# Patient Record
Sex: MALE | Race: WHITE | Employment: OTHER | URBAN - NONMETROPOLITAN AREA
[De-identification: names, ages, dates, MRNs, and addresses within clinical notes are randomized per-mention and may not be internally consistent; named-entity substitution may affect disease eponyms.]

---

## 2017-03-02 ENCOUNTER — OFFICE VISIT (OUTPATIENT)
Dept: PODIATRY | Age: 82
End: 2017-03-02

## 2017-03-02 VITALS
WEIGHT: 179.6 LBS | DIASTOLIC BLOOD PRESSURE: 62 MMHG | SYSTOLIC BLOOD PRESSURE: 115 MMHG | HEART RATE: 60 BPM | BODY MASS INDEX: 29.92 KG/M2 | HEIGHT: 65 IN

## 2017-03-02 DIAGNOSIS — E11.51 DM CIRC DIS TYPE II: Primary | ICD-10-CM

## 2017-03-02 DIAGNOSIS — B35.1 DERMATOPHYTOSIS OF NAIL: ICD-10-CM

## 2017-03-02 PROBLEM — R41.3 BAD MEMORY: Status: ACTIVE | Noted: 2017-01-19

## 2017-03-02 PROBLEM — E78.5 HYPERLIPIDEMIA: Status: ACTIVE | Noted: 2017-01-19

## 2017-03-02 PROBLEM — R73.9 BLOOD GLUCOSE ELEVATED: Status: ACTIVE | Noted: 2017-01-19

## 2017-03-02 PROBLEM — I10 BP (HIGH BLOOD PRESSURE): Status: ACTIVE | Noted: 2017-01-19

## 2017-03-02 PROBLEM — C61 CA OF PROSTATE (HCC): Status: ACTIVE | Noted: 2017-01-19

## 2017-03-02 PROBLEM — M25.519 ARTHRALGIA OF SHOULDER: Status: ACTIVE | Noted: 2017-01-19

## 2017-03-02 PROCEDURE — 11720 DEBRIDE NAIL 1-5: CPT | Performed by: PODIATRIST

## 2017-03-02 PROCEDURE — 1036F TOBACCO NON-USER: CPT | Performed by: PODIATRIST

## 2017-07-10 ENCOUNTER — OFFICE VISIT (OUTPATIENT)
Dept: PODIATRY | Age: 82
End: 2017-07-10
Payer: MEDICARE

## 2017-07-10 VITALS
HEIGHT: 65 IN | HEART RATE: 68 BPM | DIASTOLIC BLOOD PRESSURE: 74 MMHG | WEIGHT: 179 LBS | SYSTOLIC BLOOD PRESSURE: 128 MMHG | BODY MASS INDEX: 29.82 KG/M2

## 2017-07-10 DIAGNOSIS — B35.1 DERMATOPHYTOSIS OF NAIL: ICD-10-CM

## 2017-07-10 DIAGNOSIS — E11.51 DM CIRC DIS TYPE II: Primary | ICD-10-CM

## 2017-07-10 PROCEDURE — 11720 DEBRIDE NAIL 1-5: CPT | Performed by: PODIATRIST

## 2017-07-10 PROCEDURE — 1036F TOBACCO NON-USER: CPT | Performed by: PODIATRIST

## 2017-10-02 ENCOUNTER — OFFICE VISIT (OUTPATIENT)
Dept: PODIATRY | Age: 82
End: 2017-10-02
Payer: MEDICARE

## 2017-10-02 VITALS
RESPIRATION RATE: 20 BRPM | HEART RATE: 64 BPM | SYSTOLIC BLOOD PRESSURE: 122 MMHG | HEIGHT: 65 IN | WEIGHT: 177.8 LBS | DIASTOLIC BLOOD PRESSURE: 70 MMHG | BODY MASS INDEX: 29.62 KG/M2

## 2017-10-02 DIAGNOSIS — E11.51 CONTROLLED TYPE 2 DM WITH PERIPHERAL CIRCULATORY DISORDER (HCC): Primary | ICD-10-CM

## 2017-10-02 DIAGNOSIS — B35.1 DERMATOPHYTOSIS OF NAIL: ICD-10-CM

## 2017-10-02 PROCEDURE — 11720 DEBRIDE NAIL 1-5: CPT | Performed by: PODIATRIST

## 2017-10-02 PROCEDURE — 1036F TOBACCO NON-USER: CPT | Performed by: PODIATRIST

## 2017-10-02 RX ORDER — SIMVASTATIN 40 MG
TABLET ORAL
COMMUNITY
Start: 2017-06-23 | End: 2017-10-02 | Stop reason: SDUPTHER

## 2017-10-02 RX ORDER — TAMSULOSIN HYDROCHLORIDE 0.4 MG/1
CAPSULE ORAL
COMMUNITY
Start: 2017-04-17

## 2017-10-02 RX ORDER — WARFARIN SODIUM 5 MG/1
TABLET ORAL
COMMUNITY
Start: 2017-09-08

## 2017-10-02 RX ORDER — SOTALOL HYDROCHLORIDE 80 MG/1
TABLET ORAL
COMMUNITY
Start: 2017-08-29 | End: 2019-08-06 | Stop reason: ALTCHOICE

## 2017-10-02 RX ORDER — SIMVASTATIN 40 MG
TABLET ORAL
COMMUNITY
Start: 2017-08-28

## 2017-10-02 RX ORDER — LISINOPRIL 20 MG/1
TABLET ORAL
COMMUNITY
Start: 2017-06-23

## 2017-10-02 NOTE — MR AVS SNAPSHOT
people who are more muscular. If your body fat is high you can improve your BMI by decreasing your calorie intake and becoming more physically active. Learn more at: [email or web address]             Medications and Orders      Your Current Medications Are              lisinopril (PRINIVIL;ZESTRIL) 20 MG tablet TAKE 1/2 TABLET EVERY DAY    metFORMIN (GLUCOPHAGE) 500 MG tablet Take 500 mg by mouth daily    sotalol (BETAPACE) 80 MG tablet TAKE 1 TABLET TWICE DAILY    tamsulosin (FLOMAX) 0.4 MG capsule TAKE 1 CAPSULE AT BEDTIME    warfarin (COUMADIN) 5 MG tablet TAKE 1 TABLET DAILY EXCEPT ON WEDNESDAY TAKE 1 AND 1/2 TABLETS    simvastatin (ZOCOR) 40 MG tablet       Allergies           No Known Allergies      We Ordered/Performed the following            DIABETES FOOT EXAM     KS DEBRIDEMENT OF NAIL(S), 1-5          Additional Information        Basic Information     Date Of Birth Sex Race Ethnicity Preferred Language    1/10/1929 Male Terry Stewart Non-/Non  English      Problem List as of 10/2/2017  Date Reviewed: 10/2/2017                Arthralgia of shoulder    Bad memory    CA of prostate ([facility] [address]    BP (high blood pressure)    Hyperlipidemia    Blood glucose elevated    Anticoagulant long-term use    A-fib ([facility] [facility]    Type 2 diabetes mellitus ([facility] [facility]      Immunizations as of 10/2/2017     Name Date    Influenza Virus Vaccine 11/27/2012, 11/1/2011, 12/7/2010      Preventive Care        Date Due    Tetanus Combination Vaccine (1 - Tdap) 1/10/1948    Zoster Vaccine 1/10/1989    Pneumococcal Vaccines (two) for all adults aged 72 and over (1 of 2 - PCV13) 1/10/1994    Yearly Flu Vaccine (1) 9/1/2017            MyChart Signup           [facility] allows you to send messages to your doctor, view your test results, renew your prescriptions, schedule appointments, view visit notes, and more. How Do I Sign Up? 1.  In your Internet browser, go to

## 2017-10-02 NOTE — PROGRESS NOTES
Subjective:  Patient presents to [facility] today for routine foot care. Patient denies any new problems with their feet. Patient's diabetic control has been not changed. No Known Allergies    Past Medical History:   Diagnosis Date    Erectile dysfunction     History of basal cell cancer     Hyperlipidemia     Hypertension     Hypertensive cardiovascular disease     Impaired fasting glucose     Osteoarthritis     Paroxysmal atrial flutter (HCC)     Prostate cancer (HCC)     post radiation, most recent prostate-specific antigen 0.00.    Skin cancer     Vasomotor rhinitis        Prior to Admission medications    Medication Sig Start Date End Date Taking? Authorizing Provider   lisinopril (PRINIVIL;ZESTRIL) 20 MG tablet TAKE 1/2 TABLET EVERY DAY 6/23/17  Yes Historical Provider, MD   metFORMIN (GLUCOPHAGE) 500 MG tablet Take 500 mg by mouth daily 8/1/15  Yes Historical Provider, MD   sotalol (BETAPACE) 80 MG tablet TAKE 1 TABLET TWICE DAILY 8/29/17  Yes Historical Provider, MD   tamsulosin (FLOMAX) 0.4 MG capsule TAKE 1 CAPSULE AT BEDTIME 4/17/17  Yes Historical Provider, MD   warfarin (COUMADIN) 5 MG tablet TAKE 1 TABLET DAILY EXCEPT ON WEDNESDAY TAKE 1 AND 1/2 TABLETS 9/8/17  Yes Historical Provider, MD   simvastatin (ZOCOR) 40 MG tablet  8/28/17  Yes Historical Provider, MD       Social History   Substance Use Topics    Smoking status: Never Smoker    Smokeless tobacco: Never Used    Alcohol use No     Review of Systems: All 12 systems reviewed and pertinent positives noted above. Objective:  Vascular: DP and PT pulses palpable 2/4, bilateral.  CFT <3 seconds, bilateral.  Hair growth present to the level of the digits, bilateral.  Edema present, bilateral.  Varicosities present, bilateral. Erythema absent, bilateral. Distal Rubor absent bilateral.  Temperature within normal limits bilateral. Hyperpigmentation present bilateral. Atrophic skin no.     Neurological: Sensation intact to

## 2018-01-08 ENCOUNTER — OFFICE VISIT (OUTPATIENT)
Dept: PODIATRY | Age: 83
End: 2018-01-08
Payer: MEDICARE

## 2018-01-08 VITALS
DIASTOLIC BLOOD PRESSURE: 68 MMHG | HEIGHT: 65 IN | HEART RATE: 64 BPM | BODY MASS INDEX: 29.39 KG/M2 | WEIGHT: 176.4 LBS | SYSTOLIC BLOOD PRESSURE: 128 MMHG

## 2018-01-08 DIAGNOSIS — I73.9 PVD (PERIPHERAL VASCULAR DISEASE) (HCC): Primary | ICD-10-CM

## 2018-01-08 DIAGNOSIS — B35.1 DERMATOPHYTOSIS OF NAIL: ICD-10-CM

## 2018-01-08 PROCEDURE — 11720 DEBRIDE NAIL 1-5: CPT | Performed by: PODIATRIST

## 2018-04-09 ENCOUNTER — OFFICE VISIT (OUTPATIENT)
Dept: PODIATRY | Age: 83
End: 2018-04-09
Payer: MEDICARE

## 2018-04-09 VITALS
HEART RATE: 72 BPM | WEIGHT: 175 LBS | SYSTOLIC BLOOD PRESSURE: 126 MMHG | RESPIRATION RATE: 20 BRPM | DIASTOLIC BLOOD PRESSURE: 70 MMHG | HEIGHT: 65 IN | BODY MASS INDEX: 29.16 KG/M2

## 2018-04-09 DIAGNOSIS — B35.1 DERMATOPHYTOSIS OF NAIL: ICD-10-CM

## 2018-04-09 DIAGNOSIS — I73.9 PVD (PERIPHERAL VASCULAR DISEASE) (HCC): Primary | ICD-10-CM

## 2018-04-09 PROCEDURE — 11720 DEBRIDE NAIL 1-5: CPT | Performed by: PODIATRIST

## 2018-07-25 ENCOUNTER — OFFICE VISIT (OUTPATIENT)
Dept: PODIATRY | Age: 83
End: 2018-07-25
Payer: MEDICARE

## 2018-07-25 VITALS
HEART RATE: 64 BPM | WEIGHT: 172.4 LBS | HEIGHT: 66 IN | BODY MASS INDEX: 27.71 KG/M2 | SYSTOLIC BLOOD PRESSURE: 128 MMHG | DIASTOLIC BLOOD PRESSURE: 68 MMHG

## 2018-07-25 DIAGNOSIS — B35.1 DERMATOPHYTOSIS OF NAIL: ICD-10-CM

## 2018-07-25 DIAGNOSIS — I73.9 PVD (PERIPHERAL VASCULAR DISEASE) (HCC): Primary | ICD-10-CM

## 2018-07-25 PROCEDURE — 99999 PR OFFICE/OUTPT VISIT,PROCEDURE ONLY: CPT | Performed by: PODIATRIST

## 2018-07-25 PROCEDURE — 11720 DEBRIDE NAIL 1-5: CPT | Performed by: PODIATRIST

## 2018-07-25 NOTE — PROGRESS NOTES
Subjective:  Patient presents to [facility] today for routine foot care. Patient denies any new problems with their feet. Patient's diabetic control has been not changed. No Known Allergies    Past Medical History:   Diagnosis Date    Erectile dysfunction     History of basal cell cancer     Hyperlipidemia     Hypertension     Hypertensive cardiovascular disease     Impaired fasting glucose     Osteoarthritis     Paroxysmal atrial flutter (HCC)     Prostate cancer (HCC)     post radiation, most recent prostate-specific antigen 0.00.    Skin cancer     Vasomotor rhinitis        Prior to Admission medications    Medication Sig Start Date End Date Taking? Authorizing Provider   lisinopril (PRINIVIL;ZESTRIL) 20 MG tablet TAKE 1/2 TABLET EVERY DAY 6/23/17  Yes Historical Provider, MD   metFORMIN (GLUCOPHAGE) 500 MG tablet Take 500 mg by mouth daily 8/1/15  Yes Historical Provider, MD   sotalol (BETAPACE) 80 MG tablet TAKE 1 TABLET TWICE DAILY 8/29/17  Yes Historical Provider, MD   tamsulosin (FLOMAX) 0.4 MG capsule TAKE 1 CAPSULE AT BEDTIME 4/17/17  Yes Historical Provider, MD   warfarin (COUMADIN) 5 MG tablet TAKE 1 TABLET DAILY EXCEPT ON WEDNESDAY TAKE 1 AND 1/2 TABLETS 9/8/17  Yes Historical Provider, MD   simvastatin (ZOCOR) 40 MG tablet  8/28/17  Yes Historical Provider, MD       Social History   Substance Use Topics    Smoking status: Never Smoker    Smokeless tobacco: Never Used    Alcohol use No     Review of Systems: All 12 systems reviewed and pertinent positives noted above. Objective:  Vascular: DP and PT pulses palpable 2/4, bilateral.  CFT <3 seconds, bilateral.  Hair growth present to the level of the digits, bilateral.  Edema present, bilateral.  Varicosities present, bilateral. Erythema absent, bilateral. Distal Rubor absent bilateral.  Temperature within normal limits bilateral. Hyperpigmentation present bilateral. Atrophic skin no.     Neurological: Sensation intact to light touch to level of digits, bilateral.  Protective sensation intact 10/10 sites via 5.07/10g [place] Monofilament, bilateral.  negative Tinel's, bilateral.  negative Valleix sign, bilateral.  Vibratory intact bilateral.  Reflexes Decreased bilateral.  Paresthesias negative. Dysthesias negative. Sharp/dull intact bilateral.    Integument:   Open lesion absent, Bilateral.  Interdigital maceration absent to web spaces,absent Bilateral.  Nails left 1 and right 1 thickened, dystrophic and crumbly, discolored with subungual debris. Fissures absent, Bilateral. Hyperkeratotic tissue is absent. Assessment:    Diagnosis Orders   1. PVD (peripheral vascular disease) (HCC)  HM DIABETES FOOT EXAM    CT DEBRIDEMENT OF NAIL(S), 1-5   2. Dermatophytosis of nail  [facility] DIABETES FOOT EXAM    CT DEBRIDEMENT OF NAIL(S), 1-5       Plan:  Diabetic foot education and exam.  Nails as mentioned above debrided in length and thickness. Patient advised about OTC treatments for nails and callous. Patient will follow up in 10 weeks for routine foot care or PRN if any further problems arise.

## 2018-07-25 NOTE — PROGRESS NOTES
Foot Care Worksheet  PCP: Mirella Shelby  Last visit:  1/31/18    Nail description:  Thick , Yellow , Crumbly , Marked limitation of ambulation     Pain resulting from thickened and dystrophy of nail plate No    Nails involved  Right   1  (T5-T9)  Left     1 (TA-T4)    Q7 1 Class A Finding - Non traumatic amputation of foot No    Q8 2 Class B Findings - Absent DP pulse No, Absent PT pulse No, Advanced tropic changes (3 required) Yes    Decrease hair growth Yes, Nail changes/thickening Yes, Pigmented changes/discoloration Yes, Skin texture (thin, shiny) Yes, Skin color (rubor/redness) No    Q9 1 Class B and 2 Class C Findings  Claudication No, Temperature change Yes, Paresthesia No, Burning No, Edema Yes

## 2019-01-02 ENCOUNTER — ANTI-COAG VISIT (OUTPATIENT)
Dept: INTERNAL MEDICINE | Age: 84
End: 2019-01-02

## 2019-01-29 ENCOUNTER — TELEPHONE (OUTPATIENT)
Dept: PODIATRY | Age: 84
End: 2019-01-29

## 2019-06-06 ENCOUNTER — HOSPITAL ENCOUNTER (OUTPATIENT)
Age: 84
Setting detail: SPECIMEN
Discharge: HOME | End: 2019-06-06
Payer: MEDICARE

## 2019-06-06 LAB
INR BLD: 2.8
PROTHROMBIN TIME: 27.9 SEC (ref 9.4–11.3)

## 2019-06-06 PROCEDURE — 85610 PROTHROMBIN TIME: CPT

## 2019-08-06 ENCOUNTER — OFFICE VISIT (OUTPATIENT)
Dept: PODIATRY | Age: 84
End: 2019-08-06
Payer: MEDICARE

## 2019-08-06 VITALS
DIASTOLIC BLOOD PRESSURE: 72 MMHG | SYSTOLIC BLOOD PRESSURE: 124 MMHG | HEART RATE: 80 BPM | RESPIRATION RATE: 20 BRPM | WEIGHT: 182 LBS | BODY MASS INDEX: 30.32 KG/M2 | HEIGHT: 65 IN

## 2019-08-06 DIAGNOSIS — I73.9 PVD (PERIPHERAL VASCULAR DISEASE) (HCC): Primary | ICD-10-CM

## 2019-08-06 DIAGNOSIS — B35.1 DERMATOPHYTOSIS OF NAIL: ICD-10-CM

## 2019-08-06 PROCEDURE — 11720 DEBRIDE NAIL 1-5: CPT | Performed by: PODIATRIST

## 2019-08-06 PROCEDURE — 99999 PR OFFICE/OUTPT VISIT,PROCEDURE ONLY: CPT | Performed by: PODIATRIST

## 2019-08-06 RX ORDER — TAMSULOSIN HYDROCHLORIDE 0.4 MG/1
0.4 CAPSULE ORAL
COMMUNITY
Start: 2019-02-27 | End: 2019-08-06 | Stop reason: SDUPTHER

## 2019-08-06 RX ORDER — WARFARIN SODIUM 5 MG/1
TABLET ORAL
COMMUNITY
Start: 2019-02-27 | End: 2019-08-06 | Stop reason: SDUPTHER

## 2019-08-06 RX ORDER — POTASSIUM CHLORIDE 750 MG/1
10 TABLET, EXTENDED RELEASE ORAL
COMMUNITY
Start: 2019-04-22

## 2019-08-06 RX ORDER — ATORVASTATIN CALCIUM 10 MG/1
10 TABLET, FILM COATED ORAL
COMMUNITY
Start: 2019-06-21

## 2019-08-06 RX ORDER — METOPROLOL SUCCINATE 25 MG/1
25 TABLET, EXTENDED RELEASE ORAL
COMMUNITY
Start: 2019-02-27

## 2019-08-06 RX ORDER — FUROSEMIDE 20 MG/1
20 TABLET ORAL
COMMUNITY
Start: 2019-06-28

## 2019-10-15 ENCOUNTER — OFFICE VISIT (OUTPATIENT)
Dept: PODIATRY | Age: 84
End: 2019-10-15
Payer: MEDICARE

## 2019-10-15 VITALS
DIASTOLIC BLOOD PRESSURE: 64 MMHG | SYSTOLIC BLOOD PRESSURE: 124 MMHG | BODY MASS INDEX: 30.99 KG/M2 | WEIGHT: 186 LBS | RESPIRATION RATE: 20 BRPM | HEIGHT: 65 IN | HEART RATE: 64 BPM

## 2019-10-15 DIAGNOSIS — I73.9 PVD (PERIPHERAL VASCULAR DISEASE) (HCC): Primary | ICD-10-CM

## 2019-10-15 DIAGNOSIS — B35.1 DERMATOPHYTOSIS OF NAIL: ICD-10-CM

## 2019-10-15 PROCEDURE — 11720 DEBRIDE NAIL 1-5: CPT | Performed by: PODIATRIST

## 2019-10-15 PROCEDURE — 99999 PR OFFICE/OUTPT VISIT,PROCEDURE ONLY: CPT | Performed by: PODIATRIST

## 2019-10-15 RX ORDER — TOBRAMYCIN 3 MG/ML
SOLUTION/ DROPS OPHTHALMIC
Refills: 0 | COMMUNITY
Start: 2019-10-08

## 2019-10-17 ENCOUNTER — HOSPITAL ENCOUNTER (OUTPATIENT)
Age: 84
Setting detail: SPECIMEN
Discharge: HOME | End: 2019-10-17
Payer: MEDICARE

## 2019-10-17 LAB
INR BLD: 2.4
PROTHROMBIN TIME: 23.6 SEC (ref 9.4–11.3)

## 2019-10-17 PROCEDURE — 85610 PROTHROMBIN TIME: CPT

## 2020-02-11 ENCOUNTER — OFFICE VISIT (OUTPATIENT)
Dept: PODIATRY | Age: 85
End: 2020-02-11
Payer: MEDICARE

## 2020-02-11 VITALS
BODY MASS INDEX: 31.62 KG/M2 | HEART RATE: 74 BPM | SYSTOLIC BLOOD PRESSURE: 130 MMHG | WEIGHT: 190 LBS | DIASTOLIC BLOOD PRESSURE: 74 MMHG

## 2020-02-11 PROCEDURE — 99999 PR OFFICE/OUTPT VISIT,PROCEDURE ONLY: CPT | Performed by: PODIATRIST

## 2020-02-11 PROCEDURE — 11720 DEBRIDE NAIL 1-5: CPT | Performed by: PODIATRIST

## 2020-02-11 NOTE — PROGRESS NOTES
Subjective:  Patient presents to [facility] today for routine foot care. Patient denies any new problems with their feet. Patient's diabetic control has been not changed. No Known Allergies    Past Medical History:   Diagnosis Date    Erectile dysfunction     History of basal cell cancer     Hyperlipidemia     Hypertension     Hypertensive cardiovascular disease     Impaired fasting glucose     Osteoarthritis     Paroxysmal atrial flutter (HCC)     Prostate cancer (HCC)     post radiation, most recent prostate-specific antigen 0.00.    Skin cancer     Vasomotor rhinitis        Prior to Admission medications    Medication Sig Start Date End Date Taking?  Authorizing Provider   tobramycin (TOBREX) 0.3 % ophthalmic solution  10/8/19  Yes Historical Provider, MD   furosemide (LASIX) 20 MG tablet Take 20 mg by mouth 6/28/19  Yes Historical Provider, MD   potassium chloride (KLOR-CON M) 10 MEQ extended release tablet Take 10 mEq by mouth 4/22/19  Yes Historical Provider, MD   metoprolol succinate (TOPROL XL) 25 MG extended release tablet Take 25 mg by mouth 2/27/19  Yes Historical Provider, MD   atorvastatin (LIPITOR) 10 MG tablet Take 10 mg by mouth 6/21/19  Yes Historical Provider, MD   Cholecalciferol 2000 units TABS Take 2,000 Units by mouth 11/10/18  Yes Historical Provider, MD   Calcium Carb-Cholecalciferol (CALCIUM 1000 + D) 1000-800 MG-UNIT TABS Take by mouth   Yes Historical Provider, MD   lisinopril (PRINIVIL;ZESTRIL) 20 MG tablet TAKE 1/2 TABLET EVERY DAY 6/23/17  Yes Historical Provider, MD   metFORMIN (GLUCOPHAGE) 500 MG tablet Take 500 mg by mouth daily 8/1/15  Yes Historical Provider, MD   tamsulosin (FLOMAX) 0.4 MG capsule TAKE 1 CAPSULE AT BEDTIME 4/17/17  Yes Historical Provider, MD   warfarin (COUMADIN) 5 MG tablet TAKE 1 TABLET DAILY EXCEPT ON WEDNESDAY TAKE 1 AND 1/2 TABLETS 9/8/17  Yes Historical Provider, MD   simvastatin (ZOCOR) 40 MG tablet  8/28/17  Yes Historical

## 2020-09-01 ENCOUNTER — OFFICE VISIT (OUTPATIENT)
Dept: PODIATRY | Age: 85
End: 2020-09-01
Payer: MEDICARE

## 2020-09-01 VITALS
BODY MASS INDEX: 32.28 KG/M2 | SYSTOLIC BLOOD PRESSURE: 124 MMHG | RESPIRATION RATE: 24 BRPM | WEIGHT: 194 LBS | HEART RATE: 68 BPM | DIASTOLIC BLOOD PRESSURE: 64 MMHG

## 2020-09-01 PROCEDURE — 11720 DEBRIDE NAIL 1-5: CPT | Performed by: PODIATRIST

## 2020-09-01 PROCEDURE — 99999 PR OFFICE/OUTPT VISIT,PROCEDURE ONLY: CPT | Performed by: PODIATRIST

## 2020-09-01 NOTE — PROGRESS NOTES
Subjective:  Patient presents to [facility] today for routine foot care. Patient denies any new problems with their feet. Patient's diabetic control has been not changed. No Known Allergies    Past Medical History:   Diagnosis Date    Erectile dysfunction     History of basal cell cancer     Hyperlipidemia     Hypertension     Hypertensive cardiovascular disease     Impaired fasting glucose     Osteoarthritis     Paroxysmal atrial flutter (HCC)     Prostate cancer (HCC)     post radiation, most recent prostate-specific antigen 0.00.    Skin cancer     Vasomotor rhinitis        Prior to Admission medications    Medication Sig Start Date End Date Taking?  Authorizing Provider   tobramycin (TOBREX) 0.3 % ophthalmic solution  10/8/19  Yes Historical Provider, MD   furosemide (LASIX) 20 MG tablet Take 20 mg by mouth 6/28/19  Yes Historical Provider, MD   potassium chloride (KLOR-CON M) 10 MEQ extended release tablet Take 10 mEq by mouth 4/22/19  Yes Historical Provider, MD   metoprolol succinate (TOPROL XL) 25 MG extended release tablet Take 25 mg by mouth 2/27/19  Yes Historical Provider, MD   atorvastatin (LIPITOR) 10 MG tablet Take 10 mg by mouth 6/21/19  Yes Historical Provider, MD   Cholecalciferol 2000 units TABS Take 2,000 Units by mouth 11/10/18  Yes Historical Provider, MD   Calcium Carb-Cholecalciferol (CALCIUM 1000 + D) 1000-800 MG-UNIT TABS Take by mouth   Yes Historical Provider, MD   lisinopril (PRINIVIL;ZESTRIL) 20 MG tablet TAKE 1/2 TABLET EVERY DAY 6/23/17  Yes Historical Provider, MD   metFORMIN (GLUCOPHAGE) 500 MG tablet Take 500 mg by mouth daily 8/1/15  Yes Historical Provider, MD   tamsulosin (FLOMAX) 0.4 MG capsule TAKE 1 CAPSULE AT BEDTIME 4/17/17  Yes Historical Provider, MD   warfarin (COUMADIN) 5 MG tablet TAKE 1 TABLET DAILY EXCEPT ON WEDNESDAY TAKE 1 AND 1/2 TABLETS 9/8/17  Yes Historical Provider, MD   simvastatin (ZOCOR) 40 MG tablet  8/28/17  Yes Historical Provider, MD       Social History     Tobacco Use    Smoking status: Never Smoker    Smokeless tobacco: Never Used   Substance Use Topics    Alcohol use: No     Alcohol/week: 0.0 standard drinks     Review of Systems: All 12 systems reviewed and pertinent positives noted above. Objective:  Vascular: DP and PT pulses palpable 2/4, bilateral.  CFT <3 seconds, bilateral.  Hair growth present to the level of the digits, bilateral.  Edema present, bilateral.  Varicosities present, bilateral. Erythema absent, bilateral. Distal Rubor absent bilateral.  Temperature within normal limits bilateral. Hyperpigmentation present bilateral. Atrophic skin no. Neurological: Sensation intact to light touch to level of digits, bilateral.  Protective sensation intact 10/10 sites via 5.07/10g [place] Monofilament, bilateral.  negative Tinel's, bilateral.  negative Valleix sign, bilateral.  Vibratory intact bilateral.  Reflexes Decreased bilateral.  Paresthesias negative. Dysthesias negative. Sharp/dull intact bilateral.    Integument:   Open lesion absent, Bilateral.  Interdigital maceration absent to web spaces,absent Bilateral.  Nails left 1 and right 1 thickened, dystrophic and crumbly, discolored with subungual debris. Fissures absent, Bilateral. Hyperkeratotic tissue is absent. Assessment:    Diagnosis Orders   1. Atherosclerosis of native artery of both lower extremities, with unspecified presence of clinical manifestation ([address]     2. Dermatophytosis of nail         Plan:  Diabetic foot education and exam.  Nails as mentioned above debrided in length and thickness. Patient advised about OTC treatments for nails and callous. Patient will follow up in 10 weeks for routine foot care or PRN if any further problems arise.

## 2020-09-01 NOTE — PROGRESS NOTES
Foot Care Worksheet  PCP: Isabelle Duque  Last visit:  10/01/2019    Nail description:  Thick , Yellow , Crumbly , Marked limitation of ambulation     Pain resulting from thickened and dystrophy of nail plate No    Nails involved  Right   1  (T5-T9)  Left     1 (TA-T4)    Q7 1 Class A Finding - Non traumatic amputation of foot No    Q8 2 Class B Findings - Absent DP pulse No, Absent PT pulse No, Advanced tropic changes (3 required) Yes    Decrease hair growth Yes, Nail changes/thickening Yes, Pigmented changes/discoloration Yes, Skin texture (thin, shiny) Yes, Skin color (rubor/redness) No    Q9 1 Class B and 2 Class C Findings  Claudication No, Temperature change Yes, Paresthesia No, Burning No, Edema Yes

## 2021-01-26 ENCOUNTER — HOSPITAL ENCOUNTER (OUTPATIENT)
Dept: LAB | Age: 86
Discharge: HOME | End: 2021-01-26
Payer: MEDICARE

## 2021-01-26 ENCOUNTER — OFFICE VISIT (OUTPATIENT)
Dept: PODIATRY | Age: 86
End: 2021-01-26
Payer: MEDICARE

## 2021-01-26 VITALS
SYSTOLIC BLOOD PRESSURE: 126 MMHG | BODY MASS INDEX: 31.48 KG/M2 | HEART RATE: 72 BPM | DIASTOLIC BLOOD PRESSURE: 72 MMHG | RESPIRATION RATE: 20 BRPM | WEIGHT: 189.2 LBS

## 2021-01-26 DIAGNOSIS — B35.1 DERMATOPHYTOSIS OF NAIL: ICD-10-CM

## 2021-01-26 DIAGNOSIS — I70.203 ATHEROSCLEROSIS OF NATIVE ARTERY OF BOTH LOWER EXTREMITIES, WITH UNSPECIFIED PRESENCE OF CLINICAL MANIFESTATION (HCC): Primary | ICD-10-CM

## 2021-01-26 LAB
ALBUMIN SERPL-MCNC: 4.1 G/DL (ref 3.5–5.2)
ALBUMIN/GLOBULIN RATIO: 1.3 (ref 1–2.5)
ALP BLD-CCNC: 81 U/L (ref 40–129)
ALT SERPL-CCNC: 15 U/L (ref 5–41)
ANION GAP SERPL CALCULATED.3IONS-SCNC: 10 MMOL/L (ref 9–17)
AST SERPL-CCNC: 19 U/L
BILIRUB SERPL-MCNC: 0.48 MG/DL (ref 0.3–1.2)
BUN BLDV-MCNC: 20 MG/DL (ref 8–23)
BUN/CREAT BLD: 15 (ref 9–20)
CALCIUM SERPL-MCNC: 9.6 MG/DL (ref 8.6–10.4)
CHLORIDE BLD-SCNC: 100 MMOL/L (ref 98–107)
CO2: 27 MMOL/L (ref 20–31)
CREAT SERPL-MCNC: 1.35 MG/DL (ref 0.7–1.2)
D-DIMER QUANTITATIVE: <0.27 MG/L FEU (ref 0–0.59)
GFR AFRICAN AMERICAN: 60 ML/MIN
GFR NON-AFRICAN AMERICAN: 49 ML/MIN
GFR SERPL CREATININE-BSD FRML MDRD: ABNORMAL ML/MIN/{1.73_M2}
GFR SERPL CREATININE-BSD FRML MDRD: ABNORMAL ML/MIN/{1.73_M2}
GLUCOSE BLD-MCNC: 136 MG/DL (ref 70–99)
POTASSIUM SERPL-SCNC: 4.4 MMOL/L (ref 3.7–5.3)
SODIUM BLD-SCNC: 137 MMOL/L (ref 135–144)
TOTAL PROTEIN: 7.3 G/DL (ref 6.4–8.3)

## 2021-01-26 PROCEDURE — 36415 COLL VENOUS BLD VENIPUNCTURE: CPT

## 2021-01-26 PROCEDURE — 80053 COMPREHEN METABOLIC PANEL: CPT

## 2021-01-26 PROCEDURE — 85379 FIBRIN DEGRADATION QUANT: CPT

## 2021-01-26 PROCEDURE — 99999 PR OFFICE/OUTPT VISIT,PROCEDURE ONLY: CPT | Performed by: PODIATRIST

## 2021-01-26 PROCEDURE — 11720 DEBRIDE NAIL 1-5: CPT | Performed by: PODIATRIST

## 2021-01-26 NOTE — PROGRESS NOTES
Subjective:  Patient presents to [facility] today for routine foot care. Patient denies any new problems with their feet. Patient's diabetic control has been not changed. No Known Allergies    Past Medical History:   Diagnosis Date    Erectile dysfunction     History of basal cell cancer     Hyperlipidemia     Hypertension     Hypertensive cardiovascular disease     Impaired fasting glucose     Osteoarthritis     Paroxysmal atrial flutter (HCC)     Prostate cancer (HCC)     post radiation, most recent prostate-specific antigen 0.00.    Skin cancer     Vasomotor rhinitis        Prior to Admission medications    Medication Sig Start Date End Date Taking?  Authorizing Provider   tobramycin (TOBREX) 0.3 % ophthalmic solution  10/8/19  Yes Historical Provider, MD   furosemide (LASIX) 20 MG tablet Take 20 mg by mouth 6/28/19  Yes Historical Provider, MD   potassium chloride (KLOR-CON M) 10 MEQ extended release tablet Take 10 mEq by mouth 4/22/19  Yes Historical Provider, MD   metoprolol succinate (TOPROL XL) 25 MG extended release tablet Take 25 mg by mouth 2/27/19  Yes Historical Provider, MD   atorvastatin (LIPITOR) 10 MG tablet Take 10 mg by mouth 6/21/19  Yes Historical Provider, MD   Cholecalciferol 2000 units TABS Take 2,000 Units by mouth 11/10/18  Yes Historical Provider, MD   Calcium Carb-Cholecalciferol (CALCIUM 1000 + D) 1000-800 MG-UNIT TABS Take by mouth   Yes Historical Provider, MD   lisinopril (PRINIVIL;ZESTRIL) 20 MG tablet TAKE 1/2 TABLET EVERY DAY 6/23/17  Yes Historical Provider, MD   metFORMIN (GLUCOPHAGE) 500 MG tablet Take 500 mg by mouth daily 8/1/15  Yes Historical Provider, MD   tamsulosin (FLOMAX) 0.4 MG capsule TAKE 1 CAPSULE AT BEDTIME 4/17/17  Yes Historical Provider, MD   warfarin (COUMADIN) 5 MG tablet TAKE 1 TABLET DAILY EXCEPT ON WEDNESDAY TAKE 1 AND 1/2 TABLETS 9/8/17  Yes Historical Provider, MD simvastatin (ZOCOR) 40 MG tablet  8/28/17  Yes Historical Provider, MD       Social History     Tobacco Use    Smoking status: Never Smoker    Smokeless tobacco: Never Used   Substance Use Topics    Alcohol use: No     Alcohol/week: 0.0 standard drinks     Review of Systems: All 12 systems reviewed and pertinent positives noted above. Objective:  Vascular: DP and PT pulses palpable 2/4, bilateral.  CFT <3 seconds, bilateral.  Hair growth present to the level of the digits, bilateral.  Edema present, bilateral.  Varicosities present, bilateral. Erythema absent, bilateral. Distal Rubor absent bilateral.  Temperature within normal limits bilateral. Hyperpigmentation present bilateral. Atrophic skin no. Neurological: Sensation intact to light touch to level of digits, bilateral.  Protective sensation intact 10/10 sites via 5.07/10g [place]ito Monofilament, bilateral.  negative Tinel's, bilateral.  negative Valleix sign, bilateral.  Vibratory intact bilateral.  Reflexes Decreased bilateral.  Paresthesias negative. Dysthesias negative. Sharp/dull intact bilateral.    Integument:   Open lesion absent, Bilateral.  Interdigital maceration absent to web spaces,absent Bilateral.  Nails left 1 and right 1 thickened, dystrophic and crumbly, discolored with subungual debris. Fissures absent, Bilateral. Hyperkeratotic tissue is absent. Assessment:    Diagnosis Orders   1. Atherosclerosis of native artery of both lower extremities, with unspecified presence of clinical manifestation ([address]     2. Dermatophytosis of nail         Plan:  Diabetic foot education and exam.  Nails as mentioned above debrided in length and thickness. Patient advised about OTC treatments for nails and callous. Patient will follow up in 10 weeks for routine foot care or PRN if any further problems arise.

## 2021-01-26 NOTE — PROGRESS NOTES
Foot Care Worksheet  PCP: Cristal Adams  Last visit:  01 / 26 /     Nail description:  Thick , Yellow , Crumbly , Marked limitation of ambulation     Pain resulting from thickened and dystrophy of nail plate No    Nails involved  Right   1  (T5-T9)  Left     1 (TA-T4)    Q7 1 Class A Finding - Non traumatic amputation of foot No    Q8 2 Class B Findings - Absent DP pulse No, Absent PT pulse No, Advanced tropic changes (3 required) Yes    Decrease hair growth Yes, Nail changes/thickening Yes, Pigmented changes/discoloration Yes, Skin texture (thin, shiny) Yes, Skin color (rubor/redness) No    Q9 1 Class B and 2 Class C Findings  Claudication No, Temperature change Yes, Paresthesia No, Burning No, Edema Yes